# Patient Record
Sex: FEMALE | Race: OTHER | HISPANIC OR LATINO | URBAN - METROPOLITAN AREA
[De-identification: names, ages, dates, MRNs, and addresses within clinical notes are randomized per-mention and may not be internally consistent; named-entity substitution may affect disease eponyms.]

---

## 2017-06-12 ENCOUNTER — EMERGENCY (EMERGENCY)
Facility: HOSPITAL | Age: 27
LOS: 1 days | Discharge: HOME | End: 2017-06-12
Attending: EMERGENCY MEDICINE | Admitting: EMERGENCY MEDICINE
Payer: SELF-PAY

## 2017-06-12 VITALS
SYSTOLIC BLOOD PRESSURE: 133 MMHG | TEMPERATURE: 99 F | HEART RATE: 62 BPM | RESPIRATION RATE: 18 BRPM | HEIGHT: 57 IN | DIASTOLIC BLOOD PRESSURE: 79 MMHG | OXYGEN SATURATION: 99 % | WEIGHT: 98.11 LBS

## 2017-06-12 LAB
APPEARANCE UR: CLEAR — SIGNIFICANT CHANGE UP
BACTERIA # UR AUTO: ABNORMAL
BILIRUB UR-MCNC: NEGATIVE — SIGNIFICANT CHANGE UP
COLOR SPEC: YELLOW — SIGNIFICANT CHANGE UP
DIFF PNL FLD: ABNORMAL
EPI CELLS # UR: SIGNIFICANT CHANGE UP
GLUCOSE UR QL: NEGATIVE MG/DL — SIGNIFICANT CHANGE UP
HCG UR QL: NEGATIVE — SIGNIFICANT CHANGE UP
KETONES UR-MCNC: NEGATIVE — SIGNIFICANT CHANGE UP
LEUKOCYTE ESTERASE UR-ACNC: ABNORMAL
NITRITE UR-MCNC: NEGATIVE — SIGNIFICANT CHANGE UP
PH UR: 7 — SIGNIFICANT CHANGE UP (ref 5–8)
PROT UR-MCNC: NEGATIVE MG/DL — SIGNIFICANT CHANGE UP
RBC CASTS # UR COMP ASSIST: SIGNIFICANT CHANGE UP /HPF (ref 0–4)
SP GR SPEC: 1 — LOW (ref 1.01–1.02)
UROBILINOGEN FLD QL: NEGATIVE MG/DL — SIGNIFICANT CHANGE UP
WBC UR QL: >50

## 2017-06-12 PROCEDURE — 99283 EMERGENCY DEPT VISIT LOW MDM: CPT

## 2017-06-12 PROCEDURE — 81025 URINE PREGNANCY TEST: CPT

## 2017-06-12 PROCEDURE — 81001 URINALYSIS AUTO W/SCOPE: CPT

## 2017-06-12 PROCEDURE — 87086 URINE CULTURE/COLONY COUNT: CPT

## 2017-06-12 PROCEDURE — T1013: CPT

## 2017-06-12 RX ORDER — CEPHALEXIN 500 MG
1 CAPSULE ORAL
Qty: 28 | Refills: 0
Start: 2017-06-12 | End: 2017-06-19

## 2017-06-12 RX ORDER — CEPHALEXIN 500 MG
500 CAPSULE ORAL ONCE
Qty: 0 | Refills: 0 | Status: COMPLETED | OUTPATIENT
Start: 2017-06-12 | End: 2017-06-12

## 2017-06-12 RX ORDER — PHENAZOPYRIDINE HCL 100 MG
100 TABLET ORAL ONCE
Qty: 0 | Refills: 0 | Status: COMPLETED | OUTPATIENT
Start: 2017-06-12 | End: 2017-06-12

## 2017-06-12 RX ORDER — PHENAZOPYRIDINE HCL 100 MG
1 TABLET ORAL
Qty: 6 | Refills: 0
Start: 2017-06-12 | End: 2017-06-14

## 2017-06-12 RX ADMIN — Medication 500 MILLIGRAM(S): at 13:03

## 2017-06-12 RX ADMIN — Medication 100 MILLIGRAM(S): at 12:20

## 2017-06-12 NOTE — ED PROVIDER NOTE - ATTENDING CONTRIBUTION TO CARE
dysuria and urinary frequency for 2 days.  no fever, back pain or vomting.  h/o multiple UTI in past.  PE; nontoxic appearing, NAD, moist mm, no cvat.

## 2017-06-12 NOTE — ED ADULT NURSE NOTE - OBJECTIVE STATEMENT
Patient found sitting in chair, awake, alert, and oriented times 3, breathing unlabored.  patient complaining of burning and pain on urination which started today.    patient also complaining of lower abdominal pain

## 2017-06-12 NOTE — ED PROVIDER NOTE - PROGRESS NOTE DETAILS
Called PT's pharmacy and she was on macrobid and bactrim in the past several weeks for UTI. Will tx with keflex with culture pending.

## 2017-06-12 NOTE — ED PROVIDER NOTE - OBJECTIVE STATEMENT
26 y/o female presents c/o burning on urination and urinary frequency x 2 days. PT reports 4 episodes of UTI this year and she just finished a course of abx 2 weeks ago. She denies any HA, dizziness, abdominal pain, fever, chills, nausea, or vomiting.

## 2017-06-13 LAB
C TRACH RRNA SPEC QL NAA+PROBE: SIGNIFICANT CHANGE UP
N GONORRHOEA RRNA SPEC QL NAA+PROBE: SIGNIFICANT CHANGE UP
SPECIMEN SOURCE: SIGNIFICANT CHANGE UP

## 2017-06-14 LAB
CULTURE RESULTS: SIGNIFICANT CHANGE UP
SPECIMEN SOURCE: SIGNIFICANT CHANGE UP

## 2020-06-12 NOTE — ED ADULT NURSE NOTE - PAIN: BODY LOCATION
CHIEF COMPLAINT: Low back and buttock pain and bilateral leg pains  PROCEDURE: Repeat L4-5 interlaminar epidural steroid injection using fluoroscopic guidance with contrast dye.   PROCEDURE DETAILS: After written informed consent was obtained from the patient, the patient was escorted to the procedure room.  The patient was placed in the prone position.  A  time out  was conducted to verify patient identity, procedure to be performed, side, site, allergies and any special requirements.  The skin over the neck and upper back region were prepped and draped in normal sterile fashion. Fluoroscopy was used to identify the interspace in an AP view and the skin was anesthetized with 2 mL of 1% lidocaine with bicarbonate buffer.  A 20-gauge 3-1/2 inch Tuohy needle was advanced using the loss of resistance technique with preservative free normal saline with fluoroscopic guidance. After negative aspiration for CSF and blood, 1.5 cc of Omnipaque contrast dye was injected revealing the appropriate epidurogram without evidence of intrathecal or intravascular spread. Following this, a 3-mL solution of 40 mg of triamcinolone with 2 cc preservative-free normal saline was slowly injected.  After injection of the medication, as the needle tip was withdrawn, it was flushed with local anesthetic.  The patient was monitored with blood pressure and pulse oximetry machines with the assistance of an RN throughout the procedure.  The patient was alert and responsive to questions throughout the procedure.   The patient tolerated the procedure well and was observed in the post-procedural area.  The patient was dismissed without apparent complications.     DIAGNOSIS:  1.  Severe spinal stenosis at L3-4 and L4-5  PLAN:  1. Performed a repeat L4-5 interlaminar epidural steroid injection.   2. The patient was instructed to follow-up at the [place] [facility] if today's procedure is not helpful.  I think she clearly has neurogenic claudication  and if today's injection is not helpful she could consider decompressive surgery although that not under my purview.  She would have to see Dr. Wallis in consultation.  Trever Wheatley MD  Diplomate of the American Board of Anesthesiology, Pain Medicine     abdomen

## 2021-11-22 ENCOUNTER — ASOB RESULT (OUTPATIENT)
Age: 31
End: 2021-11-22

## 2021-11-22 ENCOUNTER — APPOINTMENT (OUTPATIENT)
Dept: ANTEPARTUM | Facility: CLINIC | Age: 31
End: 2021-11-22
Payer: MEDICAID

## 2021-11-22 PROCEDURE — 76801 OB US < 14 WKS SINGLE FETUS: CPT

## 2021-12-17 NOTE — ED PROVIDER NOTE - NS HIV RISK FACTOR YES
Patient daughter called up and was hoping we could give him samples of Eiquis. He is in the donut hole. Per Dr Jennifer Scott it is ok to give him some. .. The daughter will be up to get them. ..
Declined

## 2022-03-01 ENCOUNTER — ASOB RESULT (OUTPATIENT)
Age: 32
End: 2022-03-01

## 2022-03-01 ENCOUNTER — APPOINTMENT (OUTPATIENT)
Dept: ANTEPARTUM | Facility: CLINIC | Age: 32
End: 2022-03-01
Payer: MEDICAID

## 2022-03-01 ENCOUNTER — NON-APPOINTMENT (OUTPATIENT)
Age: 32
End: 2022-03-01

## 2022-03-01 PROCEDURE — 76805 OB US >/= 14 WKS SNGL FETUS: CPT

## 2022-05-10 ENCOUNTER — ASOB RESULT (OUTPATIENT)
Age: 32
End: 2022-05-10

## 2022-05-10 ENCOUNTER — EMERGENCY (EMERGENCY)
Facility: HOSPITAL | Age: 32
LOS: 1 days | Discharge: HOME | End: 2022-05-10
Attending: EMERGENCY MEDICINE
Payer: COMMERCIAL

## 2022-05-10 ENCOUNTER — APPOINTMENT (OUTPATIENT)
Dept: ANTEPARTUM | Facility: CLINIC | Age: 32
End: 2022-05-10
Payer: MEDICAID

## 2022-05-10 VITALS
WEIGHT: 126.99 LBS | DIASTOLIC BLOOD PRESSURE: 74 MMHG | TEMPERATURE: 99 F | SYSTOLIC BLOOD PRESSURE: 115 MMHG | OXYGEN SATURATION: 96 % | HEIGHT: 57 IN | HEART RATE: 101 BPM | RESPIRATION RATE: 18 BRPM

## 2022-05-10 PROCEDURE — 76816 OB US FOLLOW-UP PER FETUS: CPT

## 2022-05-10 PROCEDURE — 99284 EMERGENCY DEPT VISIT MOD MDM: CPT

## 2022-05-10 NOTE — ED ADULT TRIAGE NOTE - CHIEF COMPLAINT QUOTE
Ambulatory  36 weeks pregnant reporting fever since last night. 2 of her children also sick with fevers at home. Also reports body aches and headaches. Last Tylenol 2100, unvaccinated.

## 2022-05-11 ENCOUNTER — OUTPATIENT (OUTPATIENT)
Dept: INPATIENT UNIT | Facility: HOSPITAL | Age: 32
LOS: 1 days | End: 2022-05-11
Payer: COMMERCIAL

## 2022-05-11 VITALS — SYSTOLIC BLOOD PRESSURE: 111 MMHG | DIASTOLIC BLOOD PRESSURE: 70 MMHG | HEART RATE: 89 BPM

## 2022-05-11 VITALS — HEART RATE: 93 BPM | DIASTOLIC BLOOD PRESSURE: 62 MMHG | SYSTOLIC BLOOD PRESSURE: 108 MMHG | TEMPERATURE: 99 F

## 2022-05-11 DIAGNOSIS — O47.03 FALSE LABOR BEFORE 37 COMPLETED WEEKS OF GESTATION, THIRD TRIMESTER: ICD-10-CM

## 2022-05-11 LAB
FLUAV AG NPH QL: DETECTED
FLUBV AG NPH QL: SIGNIFICANT CHANGE UP
RSV RNA NPH QL NAA+NON-PROBE: SIGNIFICANT CHANGE UP
SARS-COV-2 RNA SPEC QL NAA+PROBE: SIGNIFICANT CHANGE UP

## 2022-05-11 PROCEDURE — 59025 FETAL NON-STRESS TEST: CPT

## 2022-05-11 PROCEDURE — G0463: CPT

## 2022-05-11 PROCEDURE — 99285 EMERGENCY DEPT VISIT HI MDM: CPT

## 2022-05-11 PROCEDURE — 87637 SARSCOV2&INF A&B&RSV AMP PRB: CPT

## 2022-05-11 RX ORDER — ACETAMINOPHEN 500 MG
650 TABLET ORAL ONCE
Refills: 0 | Status: COMPLETED | OUTPATIENT
Start: 2022-05-11 | End: 2022-05-11

## 2022-05-11 RX ADMIN — Medication 650 MILLIGRAM(S): at 01:42

## 2022-05-11 NOTE — OB PROVIDER TRIAGE NOTE - NSOBPROVIDERNOTE_OBGYN_ALL_OB_FT
31yo  at 34 1/7 weeks gestation sent for OB clearance after influenza A diagnosis in ED    - VSS  - NST reactive  - No obstetric complaints  - Stable for discharge home with regular outpatient OB followup    Discussed with Dr. Velasquez

## 2022-05-11 NOTE — ED PROVIDER NOTE - ATTENDING APP SHARED VISIT CONTRIBUTION OF CARE
Flu-like illness, 36wks pregnant, nontoxic appearing with reassuring exam and vitals. Supportive care, antipyresis, RVP sent for follow up. Will go to L&D for fetal monitoring

## 2022-05-11 NOTE — ED POST DISCHARGE NOTE - DETAILS
5/11/2022- DAVID- Called patient via telephone with number listed in chart, there was no answer, left voicemail with phone number to call ER back regarding results, will send certified letter.

## 2022-05-11 NOTE — OB PROVIDER TRIAGE NOTE - HISTORY OF PRESENT ILLNESS
Patient is a 31yo  at 34 1/7 weeks gestation presenting to L&D for obstetrical clearance after ED visit.    Patient went to ED for respiratory symptoms and recurrent fever despite 325mg tylenol every 4-6 hours. There she was diagnosed with influenza A. She was cleared and sent to L&D for evaluation. She reports good fetal movement. She denies vaginal bleeding and leakage of fluid. She reports intermittent abdominal tightening which is rarely uncomfortable      OBHx:  G1-  at term, uncomplicated,   G2- MAB with D&C,   G3-  at term, uncomplicated,   G4-  at term, uncomplicated,   PMH: latent TB infection s/p treatment  PSH: D&C  Meds: PNV  All: denies

## 2022-05-11 NOTE — OB RN TRIAGE NOTE - MENTAL HEALTH CONDITIONS/SYMPTOMS, PROFILE
Asc Procedure Text (A): After obtaining clear surgical margins the patient was sent to an ASC for surgical repair.  The patient understands they will receive post-surgical care and follow-up from the ASC physician. none

## 2022-05-11 NOTE — ED PROVIDER NOTE - PATIENT PORTAL LINK FT
You can access the FollowMyHealth Patient Portal offered by [facility] by registering at the following website: [email or web address]. By joining [employer]’s FollowMyHealth portal, you will also be able to view your health information using other applications (apps) compatible with our system.

## 2022-05-11 NOTE — OB PROVIDER TRIAGE NOTE - NSHPPHYSICALEXAM_GEN_ALL_CORE
Vital Signs Last 24 Hrs  T(C): 37.1 (11 May 2022 02:35), Max: 37.3 (10 May 2022 23:56)  T(F): 98.8 (11 May 2022 02:35), Max: 99.2 (10 May 2022 23:56)  HR: 93 (11 May 2022 02:35) (93 - 101)  BP: 108/62 (11 May 2022 02:35) (108/62 - 115/74)  BP(mean): --  RR: 18 (11 May 2022 02:35) (18 - 18)  SpO2: 96% (10 May 2022 23:56) (96% - 96%)    FHT: baseline 140, moderate variability, + accels, - decels  [place]: irregular contractions    Gen: NAD, AOx4  CV: RRR  Abd: soft, gravid, nontender, nondistended, mild contractions palpated

## 2022-05-11 NOTE — OB RN TRIAGE NOTE - NSICDXPASTMEDICALHX_GEN_ALL_CORE_FT
PAST MEDICAL HISTORY:  No pertinent past medical history     Normal vaginal delivery 2010, 2014, 2016

## 2022-05-11 NOTE — ED PROVIDER NOTE - NS ED ATTENDING STATEMENT MOD
This was a shared visit with the ISABELLA. I reviewed and verified the documentation and independently performed the documented:

## 2022-05-11 NOTE — ED PROVIDER NOTE - CLINICAL SUMMARY MEDICAL DECISION MAKING FREE TEXT BOX
tylenol as needed for fever, pt non toxic appearing, tolerating po, flu/covid swab, pt well be sent to labor and delievery for fetal heart monitoring

## 2022-05-11 NOTE — ED PROVIDER NOTE - OBJECTIVE STATEMENT
pt is a 31 y/o female  currently 36 weeks pregnant presenting to the ed for evaluation. pt reports for the past day has been experiencing fever body aches congestion. pt states her children at home currently sick. pt has been taking tylenol at home, one tablet of 325 mg every four hours (unsure of dosing she should be taking). pt denies cp sob cough abd pain nausea vomiting vaginal bleeding or discharge dysuria hematuria back pain numbness or loss of sensation neck pain visual changes headache calf pain or leg swelling

## 2022-05-11 NOTE — OB RN TRIAGE NOTE - FALL HARM RISK - UNIVERSAL INTERVENTIONS
Bed in lowest position, wheels locked, appropriate side rails in place/Call bell, personal items and telephone in reach/Instruct patient to call for assistance before getting out of bed or chair/Non-slip footwear when patient is out of bed/[place] to call system/Physically safe environment - no spills, clutter or unnecessary equipment/Purposeful Proactive Rounding/Room/bathroom lighting operational, light cord in reach

## 2022-06-09 ENCOUNTER — ASOB RESULT (OUTPATIENT)
Age: 32
End: 2022-06-09

## 2022-06-09 ENCOUNTER — APPOINTMENT (OUTPATIENT)
Dept: ANTEPARTUM | Facility: CLINIC | Age: 32
End: 2022-06-09
Payer: MEDICAID

## 2022-06-09 PROCEDURE — 76820 UMBILICAL ARTERY ECHO: CPT

## 2022-06-09 PROCEDURE — 76819 FETAL BIOPHYS PROFIL W/O NST: CPT

## 2022-06-09 PROCEDURE — 76816 OB US FOLLOW-UP PER FETUS: CPT

## 2022-06-13 ENCOUNTER — APPOINTMENT (OUTPATIENT)
Dept: ANTEPARTUM | Facility: CLINIC | Age: 32
End: 2022-06-13
Payer: MEDICAID

## 2022-06-13 ENCOUNTER — ASOB RESULT (OUTPATIENT)
Age: 32
End: 2022-06-13

## 2022-06-13 ENCOUNTER — RESULT REVIEW (OUTPATIENT)
Age: 32
End: 2022-06-13

## 2022-06-13 ENCOUNTER — INPATIENT (INPATIENT)
Facility: HOSPITAL | Age: 32
LOS: 2 days | Discharge: HOME | End: 2022-06-16
Attending: OBSTETRICS & GYNECOLOGY | Admitting: OBSTETRICS & GYNECOLOGY
Payer: COMMERCIAL

## 2022-06-13 VITALS
SYSTOLIC BLOOD PRESSURE: 131 MMHG | HEART RATE: 91 BPM | DIASTOLIC BLOOD PRESSURE: 86 MMHG | RESPIRATION RATE: 18 BRPM | TEMPERATURE: 98 F

## 2022-06-13 DIAGNOSIS — O47.1 FALSE LABOR AT OR AFTER 37 COMPLETED WEEKS OF GESTATION: ICD-10-CM

## 2022-06-13 DIAGNOSIS — O26.893 OTHER SPECIFIED PREGNANCY RELATED CONDITIONS, THIRD TRIMESTER: ICD-10-CM

## 2022-06-13 LAB
BASOPHILS # BLD AUTO: 0.04 K/UL — SIGNIFICANT CHANGE UP (ref 0–0.2)
BASOPHILS NFR BLD AUTO: 0.4 % — SIGNIFICANT CHANGE UP (ref 0–2)
BLD GP AB SCN SERPL QL: SIGNIFICANT CHANGE UP
EOSINOPHIL # BLD AUTO: 0.11 K/UL — SIGNIFICANT CHANGE UP (ref 0–0.5)
EOSINOPHIL NFR BLD AUTO: 1 % — SIGNIFICANT CHANGE UP (ref 0–6)
HCT VFR BLD CALC: 35.7 % — SIGNIFICANT CHANGE UP (ref 34.5–45)
HGB BLD-MCNC: 11.9 G/DL — SIGNIFICANT CHANGE UP (ref 11.5–15.5)
HIV 1 & 2 AB SERPL IA.RAPID: SIGNIFICANT CHANGE UP
IMM GRANULOCYTES NFR BLD AUTO: 1.9 % — HIGH (ref 0–1.5)
LYMPHOCYTES # BLD AUTO: 2.79 K/UL — SIGNIFICANT CHANGE UP (ref 1–3.3)
LYMPHOCYTES # BLD AUTO: 25.6 % — SIGNIFICANT CHANGE UP (ref 13–44)
MCHC RBC-ENTMCNC: 28.6 PG — SIGNIFICANT CHANGE UP (ref 27–34)
MCHC RBC-ENTMCNC: 33.3 GM/DL — SIGNIFICANT CHANGE UP (ref 32–36)
MCV RBC AUTO: 85.8 FL — SIGNIFICANT CHANGE UP (ref 80–100)
MONOCYTES # BLD AUTO: 0.8 K/UL — SIGNIFICANT CHANGE UP (ref 0–0.9)
MONOCYTES NFR BLD AUTO: 7.3 % — SIGNIFICANT CHANGE UP (ref 2–14)
NEUTROPHILS # BLD AUTO: 6.96 K/UL — SIGNIFICANT CHANGE UP (ref 1.8–7.4)
NEUTROPHILS NFR BLD AUTO: 63.8 % — SIGNIFICANT CHANGE UP (ref 43–77)
PLATELET # BLD AUTO: 228 K/UL — SIGNIFICANT CHANGE UP (ref 150–400)
RBC # BLD: 4.16 M/UL — SIGNIFICANT CHANGE UP (ref 3.8–5.2)
RBC # FLD: 16.1 % — HIGH (ref 10.3–14.5)
SARS-COV-2 RNA SPEC QL NAA+PROBE: SIGNIFICANT CHANGE UP
WBC # BLD: 10.91 K/UL — HIGH (ref 3.8–10.5)
WBC # FLD AUTO: 10.91 K/UL — HIGH (ref 3.8–10.5)

## 2022-06-13 PROCEDURE — 76818 FETAL BIOPHYS PROFILE W/NST: CPT

## 2022-06-13 PROCEDURE — 76821 MIDDLE CEREBRAL ARTERY ECHO: CPT

## 2022-06-13 PROCEDURE — 76820 UMBILICAL ARTERY ECHO: CPT

## 2022-06-13 PROCEDURE — 93976 VASCULAR STUDY: CPT

## 2022-06-13 PROCEDURE — 58700 REMOVAL OF FALLOPIAN TUBE: CPT | Mod: GC

## 2022-06-13 PROCEDURE — ZZZZZ: CPT

## 2022-06-13 PROCEDURE — 88307 TISSUE EXAM BY PATHOLOGIST: CPT | Mod: 26

## 2022-06-13 RX ORDER — OXYTOCIN 10 UNIT/ML
2 VIAL (ML) INJECTION
Qty: 30 | Refills: 0 | Status: DISCONTINUED | OUTPATIENT
Start: 2022-06-13 | End: 2022-06-16

## 2022-06-13 RX ORDER — SODIUM CHLORIDE 9 MG/ML
1000 INJECTION, SOLUTION INTRAVENOUS
Refills: 0 | Status: DISCONTINUED | OUTPATIENT
Start: 2022-06-13 | End: 2022-06-14

## 2022-06-13 RX ORDER — OXYTOCIN 10 UNIT/ML
333.33 VIAL (ML) INJECTION
Qty: 20 | Refills: 0 | Status: COMPLETED | OUTPATIENT
Start: 2022-06-13 | End: 2022-06-13

## 2022-06-13 RX ORDER — CITRIC ACID/SODIUM CITRATE 300-500 MG
30 SOLUTION, ORAL ORAL ONCE
Refills: 0 | Status: DISCONTINUED | OUTPATIENT
Start: 2022-06-13 | End: 2022-06-14

## 2022-06-13 RX ADMIN — SODIUM CHLORIDE 125 MILLILITER(S): 9 INJECTION, SOLUTION INTRAVENOUS at 16:43

## 2022-06-13 RX ADMIN — Medication 2 MILLIUNIT(S)/MIN: at 17:33

## 2022-06-13 NOTE — OB RN PATIENT PROFILE - FALL HARM RISK - UNIVERSAL INTERVENTIONS
Bed in lowest position, wheels locked, appropriate side rails in place/Call bell, personal items and telephone in reach/Instruct patient to call for assistance before getting out of bed or chair/Non-slip footwear when patient is out of bed/[place] to call system/Physically safe environment - no spills, clutter or unnecessary equipment/Purposeful Proactive Rounding/Room/bathroom lighting operational, light cord in reach

## 2022-06-13 NOTE — OB PROVIDER H&P - NSHPPHYSICALEXAM_GEN_ALL_CORE
HR: 91 (06-13-22 @ 16:37) (91 - 91)  BP: 131/86 (06-13-22 @ 16:37) (131/86 - 131/86)    Gen: NAD, well-appearing, AAOx3   Abd: Soft, gravid  Ext: non-tender, non-edematous     SVE:    Bedside sono: VTX, posterior placenta  FHT: baseline 130, moderate variability, +accels, -decels   [place]: CTX irregularly HR: 91 (06-13-22 @ 16:37) (91 - 91)  BP: 131/86 (06-13-22 @ 16:37) (131/86 - 131/86)    Gen: NAD, well-appearing, AAOx3   Abd: Soft, gravid  Ext: non-tender, non-edematous     SVE:  1/30/-3  Bedside sono: VTX, posterior placenta  FHT: baseline 130, moderate variability, +accels, -decels   [place]: CTX irregularly

## 2022-06-13 NOTE — OB PROVIDER LABOR PROGRESS NOTE - NS_SUBJECTIVE/OBJECTIVE_OBGYN_ALL_OB_FT
Patient is having iol for FGR and non-reassuring FHT in the office  FHR: baseline 135bpm, mod variability, +accels, -deccels  [place]: contractions q2-3 minutes    Plan  - will continue pitocin  - arom clear Patient is having iol for FGR and non-reassuring FHT in the office  FHR: baseline 135bpm, mod variability, +accels, -deccels  [place]: contractions q2-3 minutes    Plan  - will continue pitocin  - arom clear    d/w Dr. Velasquez

## 2022-06-13 NOTE — OB PROVIDER H&P - ASSESSMENT
A/P: 32y  at 38w6d GA who presents to L&D for IOL secondary to NRFHT in office.  -Admit to L&D  -Consent  -Admission labs  -NPO, except ice chips   -IV fluids  -Labor: Intact. Irregularly CTX.  -Fetus: Cat I tracing. Continuous toco and fetal monitoring.   -GBS: Negative, no GBS ppx required     Discussed with Dr. Miranda   A/P: 32y  at 38w6d GA who presents to L&D for IOL secondary to NRFHT in office.  -Admit to L&D  -Consent  -Admission labs  -NPO, except ice chips   -IV fluids  -Labor: Intact. Irregularly CTX.  -Fetus: Cat I tracing. Continuous toco and fetal monitoring.   -GBS: Negative, no GBS ppx required   -Induction method: pit    Discussed with Dr. Miranda

## 2022-06-13 NOTE — OB PROVIDER H&P - ATTENDING COMMENTS
32y  at 38w6d GA who presents to L&D for IOL secondary to NRFHT in office. Pt had 2-3 min decel with recovery  VSS  FHT - reactive  [place] - irregular  SVE /-3  A/P  33 y/o  @ 38+6 here for IOL after decels in office   - pt followed for FGR    - plan for low dose pitocin   - will closely monitor   - pt desires BTL   - consent obtained   - will closely monitor    Tiffany Miranda MD

## 2022-06-13 NOTE — OB PROVIDER H&P - HISTORY OF PRESENT ILLNESS
32y  at 38w6d GA who presents to L&D for IOL secondary to NRFHT in office. Patient denies vaginal bleeding, contractions and leakage of fluid. She endorses good fetal movement. Denies fevers, chills, nausea, vomiting, chest pain, SOB, dizziness and headache. No other complaints at this time.     BALAJI: 22  LMP: 21    Prenatal course is significant for:  1. FGR in 8th percentile  2. Quant pos, CXR neg    POB:  G1:  (; 3gx07ah)  G2: SAB w/ D&C ()  G3:  (; 2bm97fr)  G4:  (; 6lb7oz)  G5: current  PGYN: -fibroids, -ovarian cysts, denies STD hx, denies abnormal PAPs   PMH: Denies  PSH: Denies  SH: Denies EtOH, tobacco and illicit drug use during this pregnancy  Meds: PNVs, iron  Allergies: NKDA

## 2022-06-13 NOTE — OB PROVIDER H&P - NS_PARA_OBGYN_ALL_OB_NU
----- Message from Iris Kamara CNP sent at 10/29/2019  1:06 PM CDT -----  NT proBNP has improved to 628.  Rest of the blood test are fairly stable.   3

## 2022-06-14 LAB
COVID-19 SPIKE DOMAIN AB INTERP: POSITIVE
COVID-19 SPIKE DOMAIN ANTIBODY RESULT: >250 U/ML — HIGH
HCT VFR BLD CALC: 27.2 % — LOW (ref 34.5–45)
HGB BLD-MCNC: 8.9 G/DL — LOW (ref 11.5–15.5)
MCHC RBC-ENTMCNC: 28.3 PG — SIGNIFICANT CHANGE UP (ref 27–34)
MCHC RBC-ENTMCNC: 32.7 GM/DL — SIGNIFICANT CHANGE UP (ref 32–36)
MCV RBC AUTO: 86.3 FL — SIGNIFICANT CHANGE UP (ref 80–100)
PLATELET # BLD AUTO: 191 K/UL — SIGNIFICANT CHANGE UP (ref 150–400)
RBC # BLD: 3.15 M/UL — LOW (ref 3.8–5.2)
RBC # FLD: 16.2 % — HIGH (ref 10.3–14.5)
SARS-COV-2 IGG+IGM SERPL QL IA: >250 U/ML — HIGH
SARS-COV-2 IGG+IGM SERPL QL IA: POSITIVE
T PALLIDUM AB TITR SER: NEGATIVE — SIGNIFICANT CHANGE UP
WBC # BLD: 14.04 K/UL — HIGH (ref 3.8–10.5)
WBC # FLD AUTO: 14.04 K/UL — HIGH (ref 3.8–10.5)

## 2022-06-14 RX ORDER — LANOLIN
1 OINTMENT (GRAM) TOPICAL EVERY 6 HOURS
Refills: 0 | Status: DISCONTINUED | OUTPATIENT
Start: 2022-06-14 | End: 2022-06-16

## 2022-06-14 RX ORDER — OXYCODONE HYDROCHLORIDE 5 MG/1
5 TABLET ORAL
Refills: 0 | Status: DISCONTINUED | OUTPATIENT
Start: 2022-06-14 | End: 2022-06-16

## 2022-06-14 RX ORDER — TETANUS TOXOID, REDUCED DIPHTHERIA TOXOID AND ACELLULAR PERTUSSIS VACCINE, ADSORBED 5; 2.5; 8; 8; 2.5 [IU]/.5ML; [IU]/.5ML; UG/.5ML; UG/.5ML; UG/.5ML
0.5 SUSPENSION INTRAMUSCULAR ONCE
Refills: 0 | Status: DISCONTINUED | OUTPATIENT
Start: 2022-06-14 | End: 2022-06-16

## 2022-06-14 RX ORDER — BENZOCAINE 10 %
1 GEL (GRAM) MUCOUS MEMBRANE EVERY 6 HOURS
Refills: 0 | Status: DISCONTINUED | OUTPATIENT
Start: 2022-06-14 | End: 2022-06-16

## 2022-06-14 RX ORDER — PRAMOXINE HYDROCHLORIDE 150 MG/15G
1 AEROSOL, FOAM RECTAL EVERY 4 HOURS
Refills: 0 | Status: DISCONTINUED | OUTPATIENT
Start: 2022-06-14 | End: 2022-06-16

## 2022-06-14 RX ORDER — OXYCODONE HYDROCHLORIDE 5 MG/1
5 TABLET ORAL ONCE
Refills: 0 | Status: DISCONTINUED | OUTPATIENT
Start: 2022-06-14 | End: 2022-06-16

## 2022-06-14 RX ORDER — SODIUM CHLORIDE 9 MG/ML
1000 INJECTION, SOLUTION INTRAVENOUS
Refills: 0 | Status: DISCONTINUED | OUTPATIENT
Start: 2022-06-14 | End: 2022-06-15

## 2022-06-14 RX ORDER — KETOROLAC TROMETHAMINE 30 MG/ML
30 SYRINGE (ML) INJECTION ONCE
Refills: 0 | Status: DISCONTINUED | OUTPATIENT
Start: 2022-06-14 | End: 2022-06-14

## 2022-06-14 RX ORDER — ACETAMINOPHEN 500 MG
975 TABLET ORAL
Refills: 0 | Status: DISCONTINUED | OUTPATIENT
Start: 2022-06-14 | End: 2022-06-16

## 2022-06-14 RX ORDER — HYDROCORTISONE 1 %
1 OINTMENT (GRAM) TOPICAL EVERY 6 HOURS
Refills: 0 | Status: DISCONTINUED | OUTPATIENT
Start: 2022-06-14 | End: 2022-06-16

## 2022-06-14 RX ORDER — DIPHENHYDRAMINE HCL 50 MG
25 CAPSULE ORAL EVERY 6 HOURS
Refills: 0 | Status: DISCONTINUED | OUTPATIENT
Start: 2022-06-14 | End: 2022-06-16

## 2022-06-14 RX ORDER — OXYTOCIN 10 UNIT/ML
333.33 VIAL (ML) INJECTION
Qty: 20 | Refills: 0 | Status: DISCONTINUED | OUTPATIENT
Start: 2022-06-14 | End: 2022-06-16

## 2022-06-14 RX ORDER — SIMETHICONE 80 MG/1
80 TABLET, CHEWABLE ORAL EVERY 4 HOURS
Refills: 0 | Status: DISCONTINUED | OUTPATIENT
Start: 2022-06-14 | End: 2022-06-16

## 2022-06-14 RX ORDER — DIBUCAINE 1 %
1 OINTMENT (GRAM) RECTAL EVERY 6 HOURS
Refills: 0 | Status: DISCONTINUED | OUTPATIENT
Start: 2022-06-14 | End: 2022-06-16

## 2022-06-14 RX ORDER — IBUPROFEN 200 MG
600 TABLET ORAL EVERY 6 HOURS
Refills: 0 | Status: COMPLETED | OUTPATIENT
Start: 2022-06-14 | End: 2023-05-13

## 2022-06-14 RX ORDER — MAGNESIUM HYDROXIDE 400 MG/1
30 TABLET, CHEWABLE ORAL
Refills: 0 | Status: DISCONTINUED | OUTPATIENT
Start: 2022-06-14 | End: 2022-06-16

## 2022-06-14 RX ORDER — AER TRAVELER 0.5 G/1
1 SOLUTION RECTAL; TOPICAL EVERY 4 HOURS
Refills: 0 | Status: DISCONTINUED | OUTPATIENT
Start: 2022-06-14 | End: 2022-06-16

## 2022-06-14 RX ORDER — SODIUM CHLORIDE 9 MG/ML
3 INJECTION INTRAMUSCULAR; INTRAVENOUS; SUBCUTANEOUS EVERY 8 HOURS
Refills: 0 | Status: DISCONTINUED | OUTPATIENT
Start: 2022-06-14 | End: 2022-06-16

## 2022-06-14 RX ORDER — IBUPROFEN 200 MG
600 TABLET ORAL EVERY 6 HOURS
Refills: 0 | Status: DISCONTINUED | OUTPATIENT
Start: 2022-06-14 | End: 2022-06-16

## 2022-06-14 RX ADMIN — Medication 1000 MILLIUNIT(S)/MIN: at 04:17

## 2022-06-14 RX ADMIN — Medication 30 MILLIGRAM(S): at 04:17

## 2022-06-14 RX ADMIN — SODIUM CHLORIDE 3 MILLILITER(S): 9 INJECTION INTRAMUSCULAR; INTRAVENOUS; SUBCUTANEOUS at 06:10

## 2022-06-14 RX ADMIN — Medication 975 MILLIGRAM(S): at 21:19

## 2022-06-14 RX ADMIN — Medication 600 MILLIGRAM(S): at 18:05

## 2022-06-14 RX ADMIN — Medication 975 MILLIGRAM(S): at 10:33

## 2022-06-14 NOTE — OB PROVIDER DELIVERY SUMMARY - NSSELHIDDEN_OBGYN_ALL_OB_FT
[NS_DeliveryAttending1_OBGYN_ALL_OB_FT:[id number]],[NS_DeliveryAssist1_OBGYN_ALL_OB_FT:[email or web address]=],[NS_DeliveryRN_OBGYN_ALL_OB_FT:[email or web address]=]

## 2022-06-14 NOTE — OB PROVIDER LABOR PROGRESS NOTE - ASSESSMENT
VSS  pit @8   cat 1 FHT  desires epi, bolus ongoing, anesthesia made aware  continuous fetal and toco monitoring

## 2022-06-14 NOTE — OB RN DELIVERY SUMMARY - NS_SEPSISRSKCALC_OBGYN_ALL_OB_FT
EOS calculated successfully. EOS Risk Factor: 0.5/1000 live births ([facility] national incidence); GA=39w;Temp=98.42; ROM=4.767; GBS='Negative'; Antibiotics='No antibiotics or any antibiotics < 2 hrs prior to birth'

## 2022-06-14 NOTE — OB PROVIDER DELIVERY SUMMARY - NSPROVIDERDELIVERYNOTE_OBGYN_ALL_OB_FT
pt found to be fully. pushed effectively, delivering female infant via  @0349, apgars 9/9, weight deferred for skin to skin.  placenta delivered intact @0357.  vagina and perineum inspected. first degree laceration noted and repaired.  fundus noted to be firm.  hemostasis noted.  EBL 56.  count correct.  mom and infant recovering in LDR.

## 2022-06-14 NOTE — OB RN DELIVERY SUMMARY - NSSELHIDDEN_OBGYN_ALL_OB_FT
[NS_DeliveryAttending1_OBGYN_ALL_OB_FT:[id number]==],[NS_DeliveryAssist1_OBGYN_ALL_OB_FT:[email or web address]=],[NS_DeliveryRN_OBGYN_ALL_OB_FT:[email or web address]=]

## 2022-06-14 NOTE — OB PROVIDER LABOR PROGRESS NOTE - NS_SUBJECTIVE/OBJECTIVE_OBGYN_ALL_OB_FT
pt uncomfortable    Vital Signs Last 24 Hrs  T(C): 36.9 (13 Jun 2022 19:10), Max: 36.9 (13 Jun 2022 16:37)  T(F): 98.42 (13 Jun 2022 19:10), Max: 98.42 (13 Jun 2022 16:37)  HR: 75 (14 Jun 2022 00:07) (75 - 91)  BP: 117/70 (14 Jun 2022 00:07) (117/70 - 138/76)  RR: 16 (13 Jun 2022 19:10) (16 - 18)

## 2022-06-15 ENCOUNTER — TRANSCRIPTION ENCOUNTER (OUTPATIENT)
Age: 32
End: 2022-06-15

## 2022-06-15 LAB
HCT VFR BLD CALC: 27.7 % — LOW (ref 34.5–45)
HGB BLD-MCNC: 9 G/DL — LOW (ref 11.5–15.5)
MCHC RBC-ENTMCNC: 28.2 PG — SIGNIFICANT CHANGE UP (ref 27–34)
MCHC RBC-ENTMCNC: 32.5 GM/DL — SIGNIFICANT CHANGE UP (ref 32–36)
MCV RBC AUTO: 86.8 FL — SIGNIFICANT CHANGE UP (ref 80–100)
PLATELET # BLD AUTO: 191 K/UL — SIGNIFICANT CHANGE UP (ref 150–400)
RBC # BLD: 3.19 M/UL — LOW (ref 3.8–5.2)
RBC # FLD: 16.2 % — HIGH (ref 10.3–14.5)
WBC # BLD: 13.59 K/UL — HIGH (ref 3.8–10.5)
WBC # FLD AUTO: 13.59 K/UL — HIGH (ref 3.8–10.5)

## 2022-06-15 RX ORDER — IBUPROFEN 200 MG
1 TABLET ORAL
Qty: 20 | Refills: 0
Start: 2022-06-15 | End: 2022-06-19

## 2022-06-15 RX ORDER — ACETAMINOPHEN 500 MG
3 TABLET ORAL
Qty: 60 | Refills: 0
Start: 2022-06-15 | End: 2022-06-19

## 2022-06-15 RX ADMIN — SODIUM CHLORIDE 3 MILLILITER(S): 9 INJECTION INTRAMUSCULAR; INTRAVENOUS; SUBCUTANEOUS at 21:05

## 2022-06-15 RX ADMIN — SODIUM CHLORIDE 3 MILLILITER(S): 9 INJECTION INTRAMUSCULAR; INTRAVENOUS; SUBCUTANEOUS at 00:16

## 2022-06-15 RX ADMIN — Medication 975 MILLIGRAM(S): at 21:00

## 2022-06-15 RX ADMIN — Medication 600 MILLIGRAM(S): at 17:40

## 2022-06-15 RX ADMIN — SODIUM CHLORIDE 3 MILLILITER(S): 9 INJECTION INTRAMUSCULAR; INTRAVENOUS; SUBCUTANEOUS at 05:30

## 2022-06-15 RX ADMIN — Medication 1 TABLET(S): at 15:45

## 2022-06-15 NOTE — DISCHARGE NOTE OB - NS MD DC FALL RISK RISK
For information on Fall & Injury Prevention, visit: [email or web address] OR  [email or web address] OR  https://www.cdc.gov/steadi/patient.html

## 2022-06-15 NOTE — DISCHARGE NOTE OB - PLAN OF CARE
Please call your provider to schedule postpartum visit in 6 weeks. Take medications as directed, regular diet, activity as tolerated. Exclusive breast feeding for the first 6 months is recommended. Nothing per vagina for 6 weeks (incl. sex, douching, etc). If you have additional concerns, please inform your provider. Please remove pressure dressing in 24 hours and follow up in the office in 1 week for incision check. Return if fever/sweats/chills/abnormal wound drainage.

## 2022-06-15 NOTE — DISCHARGE NOTE OB - MEDICATION SUMMARY - MEDICATIONS TO STOP TAKING
I will STOP taking the medications listed below when I get home from the hospital:    cephalexin 500 mg oral capsule  -- 1 cap(s) by mouth 4 times a day  -- Finish all this medication unless otherwise directed by prescriber.    Pyridium 100 mg oral tablet  -- 1 tab(s) by mouth 3 times a day  -- May discolor urine or feces.  Medication should be taken with plenty of water.  Take with food or milk.   I will STOP taking the medications listed below when I get home from the hospital:  None

## 2022-06-15 NOTE — PROGRESS NOTE ADULT - SUBJECTIVE AND OBJECTIVE BOX
JEREMY KENDALL is a 32y  now PPD#1 s/p normal spontaneous vaginal delivery @38w6d GA 2/2 FGR, uncomplicated del.    S:    No acute events overnight.   The patient has no complaints.  Pain controlled with current treatment regimen.   She is ambulating without difficulty and tolerating PO. NPO since midnight for tubal.  + flatus/-BM/+ voiding   She endorses appropriate lochia, which is decreasing.   She is breastfeeding baby.  She denies fevers, chills, nausea and vomiting.   She denies lightheadedness, dizziness, palpitations, chest pain and SOB.     O:    T(C): 36.4 (06-15-22 @ 04:00), Max: 36.9 (22 @ 15:10)  HR: 64 (06-15-22 @ 04:00) (64 - 78)  BP: 100/61 (06-15-22 @ 04:00) (100/61 - 133/74)  RR: 18 (06-15-22 @ 04:00) (18 - 18)  SpO2: 98% (06-15-22 @ 04:00) (98% - 98%)    Gen: NAD, AOx3  Resp: breathing comfortably on RA  Abdomen:  Soft, non-tender  Uterus:  Fundus firm below umbilicus  VE:  Lochia as expected                          8.9    14.04 )-----------( 191      ( 2022 18:16 )             27.2

## 2022-06-15 NOTE — DISCHARGE NOTE OB - PATIENT PORTAL LINK FT
You can access the FollowMyHealth Patient Portal offered by [facility] by registering at the following website: [email or web address]. By joining [employer]’s FollowMyHealth portal, you will also be able to view your health information using other applications (apps) compatible with our system.

## 2022-06-15 NOTE — DISCHARGE NOTE OB - CARE PROVIDER_API CALL
Zheng Sepulveda)  Obstetrics and Gynecology  [address] [place] [address]  Phone: [phone]  Fax: [phone]  Follow Up Time:

## 2022-06-15 NOTE — PROGRESS NOTE ADULT - ASSESSMENT
A/P:  JEREMY KENDALL is a 32y  now PPD#1 s/p normal spontaneous vaginal delivery @38w6d GA 2/2 FGR, uncomplicated del.  -Vital signs stable  -Hgb: 11.9 -> 8.9 --> AM CBC pending  -Voiding, tolerating PO. NPO since midnight for tubal.  -Advance care as tolerated   -Continue routine postpartum care and education  -Healthy female infant  -Dispo: Likely discharge today pending attending approval and postop recovery.

## 2022-06-15 NOTE — DISCHARGE NOTE OB - MEDICATION SUMMARY - MEDICATIONS TO TAKE
I will START or STAY ON the medications listed below when I get home from the hospital:    acetaminophen 325 mg oral tablet  -- 3 tab(s) by mouth every 6 hours   -- Indication: For pp pain    ibuprofen 600 mg oral tablet  -- 1 tab(s) by mouth every 6 hours  -- Indication: For pp pain    Prenatal Multivitamins with Folic Acid 1 mg oral tablet  -- 1 tab(s) by mouth once a day  -- Indication: For pp   I will START or STAY ON the medications listed below when I get home from the hospital:    acetaminophen 325 mg oral tablet  -- 3 tab(s) by mouth every 6 hours   -- Indication: For pain    ibuprofen 600 mg oral tablet  -- 1 tab(s) by mouth every 6 hours  -- Indication: For pain    Prenatal Multivitamins with Folic Acid 1 mg oral tablet  -- 1 tab(s) by mouth once a day  -- Indication: For pregnancy

## 2022-06-15 NOTE — DISCHARGE NOTE OB - CARE PLAN
1 Principal Discharge DX:	 (normal spontaneous vaginal delivery)  Assessment and plan of treatment:	Please call your provider to schedule postpartum visit in 6 weeks. Take medications as directed, regular diet, activity as tolerated. Exclusive breast feeding for the first 6 months is recommended. Nothing per vagina for 6 weeks (incl. sex, douching, etc). If you have additional concerns, please inform your provider.   Principal Discharge DX:	 (normal spontaneous vaginal delivery)  Assessment and plan of treatment:	Please call your provider to schedule postpartum visit in 6 weeks. Take medications as directed, regular diet, activity as tolerated. Exclusive breast feeding for the first 6 months is recommended. Nothing per vagina for 6 weeks (incl. sex, douching, etc). If you have additional concerns, please inform your provider.  Secondary Diagnosis:	Request for sterilization  Assessment and plan of treatment:	Please remove pressure dressing in 24 hours and follow up in the office in 1 week for incision check. Return if fever/sweats/chills/abnormal wound drainage.

## 2022-06-16 ENCOUNTER — APPOINTMENT (OUTPATIENT)
Dept: ANTEPARTUM | Facility: CLINIC | Age: 32
End: 2022-06-16

## 2022-06-16 ENCOUNTER — TRANSCRIPTION ENCOUNTER (OUTPATIENT)
Age: 32
End: 2022-06-16

## 2022-06-16 ENCOUNTER — RESULT REVIEW (OUTPATIENT)
Age: 32
End: 2022-06-16

## 2022-06-16 VITALS
RESPIRATION RATE: 18 BRPM | HEART RATE: 72 BPM | TEMPERATURE: 98 F | OXYGEN SATURATION: 100 % | DIASTOLIC BLOOD PRESSURE: 88 MMHG | SYSTOLIC BLOOD PRESSURE: 147 MMHG

## 2022-06-16 DIAGNOSIS — Z30.2 ENCOUNTER FOR STERILIZATION: ICD-10-CM

## 2022-06-16 PROCEDURE — 86900 BLOOD TYPING SEROLOGIC ABO: CPT

## 2022-06-16 PROCEDURE — 86703 HIV-1/HIV-2 1 RESULT ANTBDY: CPT

## 2022-06-16 PROCEDURE — 85025 COMPLETE CBC W/AUTO DIFF WBC: CPT

## 2022-06-16 PROCEDURE — 86901 BLOOD TYPING SEROLOGIC RH(D): CPT

## 2022-06-16 PROCEDURE — 87389 HIV-1 AG W/HIV-1&-2 AB AG IA: CPT

## 2022-06-16 PROCEDURE — 36415 COLL VENOUS BLD VENIPUNCTURE: CPT

## 2022-06-16 PROCEDURE — 86780 TREPONEMA PALLIDUM: CPT

## 2022-06-16 PROCEDURE — 86765 RUBEOLA ANTIBODY: CPT

## 2022-06-16 PROCEDURE — 86769 SARS-COV-2 COVID-19 ANTIBODY: CPT

## 2022-06-16 PROCEDURE — 88307 TISSUE EXAM BY PATHOLOGIST: CPT

## 2022-06-16 PROCEDURE — 88302 TISSUE EXAM BY PATHOLOGIST: CPT | Mod: 26

## 2022-06-16 PROCEDURE — U0003: CPT

## 2022-06-16 PROCEDURE — 88302 TISSUE EXAM BY PATHOLOGIST: CPT

## 2022-06-16 PROCEDURE — 86850 RBC ANTIBODY SCREEN: CPT

## 2022-06-16 PROCEDURE — U0005: CPT

## 2022-06-16 PROCEDURE — 85027 COMPLETE CBC AUTOMATED: CPT

## 2022-06-16 RX ORDER — ONDANSETRON 8 MG/1
4 TABLET, FILM COATED ORAL EVERY 6 HOURS
Refills: 0 | Status: DISCONTINUED | OUTPATIENT
Start: 2022-06-16 | End: 2022-06-16

## 2022-06-16 RX ORDER — KETOROLAC TROMETHAMINE 30 MG/ML
30 SYRINGE (ML) INJECTION ONCE
Refills: 0 | Status: DISCONTINUED | OUTPATIENT
Start: 2022-06-16 | End: 2022-06-16

## 2022-06-16 RX ORDER — NALOXONE HYDROCHLORIDE 4 MG/.1ML
0.1 SPRAY NASAL
Refills: 0 | Status: DISCONTINUED | OUTPATIENT
Start: 2022-06-16 | End: 2022-06-16

## 2022-06-16 RX ORDER — SODIUM CHLORIDE 9 MG/ML
1000 INJECTION, SOLUTION INTRAVENOUS ONCE
Refills: 0 | Status: COMPLETED | OUTPATIENT
Start: 2022-06-16 | End: 2022-06-16

## 2022-06-16 RX ORDER — DEXAMETHASONE 0.5 MG/5ML
4 ELIXIR ORAL EVERY 6 HOURS
Refills: 0 | Status: DISCONTINUED | OUTPATIENT
Start: 2022-06-16 | End: 2022-06-16

## 2022-06-16 RX ORDER — OXYCODONE HYDROCHLORIDE 5 MG/1
5 TABLET ORAL ONCE
Refills: 0 | Status: DISCONTINUED | OUTPATIENT
Start: 2022-06-16 | End: 2022-06-16

## 2022-06-16 RX ADMIN — SODIUM CHLORIDE 3 MILLILITER(S): 9 INJECTION INTRAMUSCULAR; INTRAVENOUS; SUBCUTANEOUS at 16:57

## 2022-06-16 RX ADMIN — OXYCODONE HYDROCHLORIDE 5 MILLIGRAM(S): 5 TABLET ORAL at 16:11

## 2022-06-16 RX ADMIN — SODIUM CHLORIDE 1000 MILLILITER(S): 9 INJECTION, SOLUTION INTRAVENOUS at 14:00

## 2022-06-16 RX ADMIN — OXYCODONE HYDROCHLORIDE 5 MILLIGRAM(S): 5 TABLET ORAL at 16:28

## 2022-06-16 RX ADMIN — Medication 600 MILLIGRAM(S): at 05:49

## 2022-06-16 RX ADMIN — SODIUM CHLORIDE 3 MILLILITER(S): 9 INJECTION INTRAMUSCULAR; INTRAVENOUS; SUBCUTANEOUS at 06:16

## 2022-06-16 RX ADMIN — SODIUM CHLORIDE 1000 MILLILITER(S): 9 INJECTION, SOLUTION INTRAVENOUS at 13:00

## 2022-06-16 RX ADMIN — Medication 30 MILLIGRAM(S): at 17:41

## 2022-06-16 NOTE — BRIEF OPERATIVE NOTE - COMMENTS
[id number] Dictation#[id number]  I attest that I was present for the duration of the case.

## 2022-06-16 NOTE — PROGRESS NOTE ADULT - ATTENDING COMMENTS
32y  now stable PPD#2 s/p normal spontaneous vaginal delivery. Patient strongly desires postpartum tubal ligation, is aware that she will have to remain NPO and that acuity on L+D may preclude the procedure. Consent signed after discussion of R/B/A, questions answered thru .

## 2022-06-16 NOTE — PROGRESS NOTE ADULT - ASSESSMENT
A/P:  JEREMY KENDALL is a 32y  now PPD#2 s/p normal spontaneous vaginal delivery @38w6d GA 2/2 FGR, uncomplicated del.  -Vital signs stable  -Hgb: 11.9 -> 8.9 --> 9  -Voiding, tolerating PO. NPO since midnight for tubal.  -Advance care as tolerated   -Continue routine postpartum care and education  -Healthy female infant  -Dispo: Likely discharge today pending attending approval and postop recovery.

## 2022-06-16 NOTE — OB RN INTRAOPERATIVE NOTE - NSSELHIDDEN_OBGYN_ALL_OB_FT
[NS_DeliveryAttending1_OBGYN_ALL_OB_FT:[id number]==],[NS_DeliveryAssist1_OBGYN_ALL_OB_FT:[email or web address]=],[NS_DeliveryRN_OBGYN_ALL_OB_FT:[email or web address]=]

## 2022-06-16 NOTE — PROGRESS NOTE ADULT - SUBJECTIVE AND OBJECTIVE BOX
JEREMY KENDALL is a 32y  now PPD#2 s/p normal spontaneous vaginal delivery @38w6d GA 2/2 FGR, uncomplicated del.    S:    No acute events overnight.   The patient has no complaints.  Pain controlled with current treatment regimen.   She is ambulating without difficulty and tolerating PO. NPO since midnight for tubal.  + flatus/-BM/+ voiding   She endorses appropriate lochia, which is decreasing.   She is breastfeeding baby.  She denies fevers, chills, nausea and vomiting.   She denies lightheadedness, dizziness, palpitations, chest pain and SOB.     O:    Vital Signs Last 24 Hrs  T(C): 36.9 (15 Merlin 2022 15:12), Max: 36.9 (15 Merlin 2022 15:12)  T(F): 98.4 (15 Merlin 2022 15:12), Max: 98.4 (15 Merlin 2022 15:12)  HR: 70 (15 Merlin 2022 15:12) (64 - 70)  BP: 115/73 (15 Merlin 2022 15:12) (111/77 - 115/73)  RR: 18 (15 Merlin 2022 15:12) (18 - 18)  SpO2: 100% (15 Merlin 2022 15:12) (98% - 100%)    Gen: NAD, AOx3  Resp: breathing comfortably on RA  Abdomen:  Soft, non-tender  Uterus:  Fundus firm below umbilicus  VE:  Lochia as expected                          9.0    13.59 )-----------( 191      ( 15 Merlin 2022 06:12 )             27.7

## 2022-07-02 ENCOUNTER — TRANSCRIPTION ENCOUNTER (OUTPATIENT)
Age: 32
End: 2022-07-02

## 2023-08-14 NOTE — ED ADULT NURSE NOTE - CAS EDP DISCH TYPE
[>50% of the face to face encounter time was spent on counseling and/or coordination of care for ___] : Greater than 50% of the face to face encounter time was spent on counseling and/or coordination of care for [unfilled] Home

## 2023-11-27 NOTE — DISCHARGE NOTE OB - HOSPITAL COURSE
Medication is being filled for 1 time refill only due to:  Patient needs to be seen because it has been more than one year since last visit.   Maite Wyatt RN on 11/27/2023 at 12:53 PM     Patient underwent a normal spontaneous vaginal delivery and postpartum salpingectomy. Post-partum and post-op course was uncomplicated. Pain is well controlled with PRN medication. She has no difficulty with ambulation, voiding, or PO intake. Lab values and vital signs are stable prior to discharge.
